# Patient Record
Sex: MALE | Race: BLACK OR AFRICAN AMERICAN
[De-identification: names, ages, dates, MRNs, and addresses within clinical notes are randomized per-mention and may not be internally consistent; named-entity substitution may affect disease eponyms.]

---

## 2018-01-12 ENCOUNTER — HOSPITAL ENCOUNTER (INPATIENT)
Dept: HOSPITAL 74 - YASAS | Age: 53
LOS: 4 days | Discharge: HOME | End: 2018-01-16
Attending: INTERNAL MEDICINE | Admitting: INTERNAL MEDICINE
Payer: COMMERCIAL

## 2018-01-12 VITALS — BODY MASS INDEX: 27 KG/M2

## 2018-01-12 DIAGNOSIS — F17.213: ICD-10-CM

## 2018-01-12 DIAGNOSIS — I10: ICD-10-CM

## 2018-01-12 DIAGNOSIS — Z88.8: ICD-10-CM

## 2018-01-12 DIAGNOSIS — Z91.010: ICD-10-CM

## 2018-01-12 DIAGNOSIS — F12.20: ICD-10-CM
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
## 2018-01-12 DIAGNOSIS — F19.24: ICD-10-CM

## 2018-01-12 DIAGNOSIS — F16.20: ICD-10-CM

## 2018-01-12 DIAGNOSIS — E78.00: ICD-10-CM
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
## 2018-01-12 DIAGNOSIS — G47.00: ICD-10-CM

## 2018-01-12 DIAGNOSIS — F10.230: Primary | ICD-10-CM
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
## 2018-01-12 DIAGNOSIS — H40.2232: ICD-10-CM

## 2018-01-12 DIAGNOSIS — F14.20: ICD-10-CM

## 2018-01-12 PROCEDURE — HZ2ZZZZ DETOXIFICATION SERVICES FOR SUBSTANCE ABUSE TREATMENT: ICD-10-PCS | Performed by: INTERNAL MEDICINE
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
## 2018-01-13 LAB
ALBUMIN SERPL-MCNC: 3.4 G/DL (ref 3.4–5)
ALP SERPL-CCNC: 98 U/L (ref 45–117)
ALT SERPL-CCNC: 37 U/L (ref 12–78)
ANION GAP SERPL CALC-SCNC: 8 MMOL/L (ref 8–16)
APPEARANCE UR: CLEAR
AST SERPL-CCNC: 32 U/L (ref 15–37)
BILIRUB SERPL-MCNC: 1.2 MG/DL (ref 0.2–1)
BILIRUB UR STRIP.AUTO-MCNC: NEGATIVE MG/DL
BUN SERPL-MCNC: 20 MG/DL (ref 7–18)
CALCIUM SERPL-MCNC: 8.8 MG/DL (ref 8.5–10.1)
CHLORIDE SERPL-SCNC: 105 MMOL/L (ref 98–107)
CO2 SERPL-SCNC: 32 MMOL/L (ref 21–32)
COLOR UR: YELLOW
CREAT SERPL-MCNC: 1.3 MG/DL (ref 0.7–1.3)
DEPRECATED RDW RBC AUTO: 16.2 % (ref 11.9–15.9)
GLUCOSE SERPL-MCNC: 83 MG/DL (ref 74–106)
HCT VFR BLD CALC: 40.9 % (ref 35.4–49)
HGB BLD-MCNC: 13.6 GM/DL (ref 11.7–16.9)
KETONES UR QL STRIP: NEGATIVE
LEUKOCYTE ESTERASE UR QL STRIP.AUTO: NEGATIVE
MCH RBC QN AUTO: 35.1 PG (ref 25.7–33.7)
MCHC RBC AUTO-ENTMCNC: 33.2 G/DL (ref 32–35.9)
MCV RBC: 105.5 FL (ref 80–96)
NITRITE UR QL STRIP: NEGATIVE
PH UR: 7 [PH] (ref 5–8)
PLATELET # BLD AUTO: 208 K/MM3 (ref 134–434)
PMV BLD: 8.5 FL (ref 7.5–11.1)
POTASSIUM SERPLBLD-SCNC: 3.5 MMOL/L (ref 3.5–5.1)
PROT SERPL-MCNC: 6.4 G/DL (ref 6.4–8.2)
PROT UR QL STRIP: NEGATIVE
PROT UR QL STRIP: NEGATIVE
RBC # BLD AUTO: 3.88 M/MM3 (ref 4–5.6)
RBC # UR STRIP: NEGATIVE /UL
SODIUM SERPL-SCNC: 145 MMOL/L (ref 136–145)
SP GR UR: 1.02 (ref 1–1.03)
UROBILINOGEN UR STRIP-MCNC: 2 MG/DL (ref 0.2–1)
WBC # BLD AUTO: 6.3 K/MM3 (ref 4–10)

## 2018-01-13 RX ADMIN — Medication SCH MG: at 22:45

## 2018-01-13 RX ADMIN — BACITRACIN ZINC SCH: 500 OINTMENT TOPICAL at 14:39

## 2018-01-13 RX ADMIN — AMLODIPINE BESYLATE SCH MG: 10 TABLET ORAL at 10:53

## 2018-01-13 RX ADMIN — IBUPROFEN PRN MG: 400 TABLET, FILM COATED ORAL at 22:47

## 2018-01-13 RX ADMIN — QUETIAPINE FUMARATE SCH MG: 100 TABLET ORAL at 22:45

## 2018-01-13 RX ADMIN — Medication SCH TAB: at 10:50

## 2018-01-13 RX ADMIN — NICOTINE SCH MG: 14 PATCH, EXTENDED RELEASE TRANSDERMAL at 10:50

## 2018-01-13 RX ADMIN — BACITRACIN ZINC SCH GM: 500 OINTMENT TOPICAL at 22:45

## 2018-01-13 RX ADMIN — LATANOPROST SCH DROP: 50 SOLUTION OPHTHALMIC at 22:46

## 2018-01-13 RX ADMIN — BACITRACIN ZINC SCH APPLIC: 500 OINTMENT TOPICAL at 06:11

## 2018-01-13 RX ADMIN — Medication SCH MG: at 00:01

## 2018-01-13 NOTE — PN
S CIWA





- CIWA Score


Nausea/Vomitin


Muscle Tremors: 2


Anxiety: 2


Agitation: 2


Paroxysmal Sweats: 2


Orientation: 0-Oriented


Tacttile Disturbances: 1-Very Mild Itch/Numbness


Auditory Disturbances: 1-Very Mild


Visual Disturbances: 1-Very Mild Sensitivity


Headache: 2-Mild


CIWA-Ar Total Score: 15





BHS Progress Note (SOAP)


Subjective: 





Shakes, sweats,diarrhea, bone pain


Objective: 





18 12:15


 Vital Signs - 8 hr











  18





  06:33 09:19


 


Temperature 97.6 F 97.2 F L


 


Pulse Rate 76 83


 


Respiratory 18 18





Rate  


 


Blood Pressure 145/101 144/104








 Laboratory Last Values











WBC  6.3 K/mm3 (4.0-10.0)   18  08:00    


 


RBC  3.88 M/mm3 (4.00-5.60)  L  18  08:00    


 


Hgb  13.6 GM/dL (11.7-16.9)   18  08:00    


 


Hct  40.9 % (35.4-49)   18  08:00    


 


MCV  105.5 fl (80-96)  H  18  08:00    


 


MCH  35.1 pg (25.7-33.7)  H  18  08:00    


 


MCHC  33.2 g/dl (32.0-35.9)   18  08:00    


 


RDW  16.2 % (11.9-15.9)  H D 18  08:00    


 


Plt Count  208 K/MM3 (134-434)   18  08:00    


 


MPV  8.5 fl (7.5-11.1)   18  08:00    


 


Sodium  145 mmol/L (136-145)   18  08:00    


 


Potassium  3.5 mmol/L (3.5-5.1)   18  08:00    


 


Chloride  105 mmol/L ()   18  08:00    


 


Carbon Dioxide  32 mmol/L (21-32)   18  08:00    


 


Anion Gap  8  (8-16)   18  08:00    


 


BUN  20 mg/dL (7-18)  H D 18  08:00    


 


Creatinine  1.3 mg/dL (0.7-1.3)   18  08:00    


 


Creat Clearance w eGFR  58.20  (>60)   18  08:00    


 


Random Glucose  83 mg/dL ()  D 18  08:00    


 


Calcium  8.8 mg/dL (8.5-10.1)   18  08:00    


 


Total Bilirubin  1.2 mg/dL (0.2-1.0)  H  18  08:00    


 


AST  32 U/L (15-37)  D 18  08:00    


 


ALT  37 U/L (12-78)   18  08:00    


 


Alkaline Phosphatase  98 U/L ()   18  08:00    


 


Total Protein  6.4 g/dl (6.4-8.2)   18  08:00    


 


Albumin  3.4 g/dl (3.4-5.0)   18  08:00    


 


Urine Color  Yellow   18  09:00    


 


Urine Appearance  Clear   18  09:00    


 


Urine pH  7.0  (5.0-8.0)  D 18  09:00    


 


Ur Specific Gravity  1.017  (1.001-1.035)   18  09:00    


 


Urine Protein  Negative  (NEGATIVE)   18  09:00    


 


Urine Glucose (UA)  Negative  (NEGATIVE)   18  09:00    


 


Urine Ketones  Negative  (NEGATIVE)   18  09:00    


 


Urine Blood  Negative  (NEGATIVE)   18  09:00    


 


Urine Nitrite  Negative  (NEGATIVE)   18  09:00    


 


Urine Bilirubin  Negative  (NEGATIVE)   18  09:00    


 


Urine Urobilinogen  2.0 mg/dL (0.2-1.0)   18  09:00    


 


Ur Leukocyte Esterase  Negative  (NEGATIVE)   18  09:00    


 


HIV 1&2 Antibody Screen  Negative   18  08:00    


 


HIV P24 Antigen  Negative   18  08:00    








Labs noted


Assessment: 





18 12:15


Withdrawal sx


Plan: 





Continue detox

## 2018-01-13 NOTE — EKG
Test Reason : 

Blood Pressure : ***/*** mmHG

Vent. Rate : 079 BPM     Atrial Rate : 079 BPM

   P-R Int : 148 ms          QRS Dur : 094 ms

    QT Int : 406 ms       P-R-T Axes : 066 029 028 degrees

   QTc Int : 465 ms

 

NORMAL SINUS RHYTHM

POSSIBLE LEFT ATRIAL ENLARGEMENT

LEFT VENTRICULAR HYPERTROPHY

ABNORMAL ECG

NO PREVIOUS ECGS AVAILABLE

Confirmed by SHIELA CAI MD ([id number]) on 1/13/2018 4:43:16 PM

 

Referred By:             Confirmed By:SHEILA CAI MD

## 2018-01-13 NOTE — CONSULT
[facility] Psychiatric Consult





- Data


Date of interview: 01/13/18


Admission source: [facility]


Identifying data: Readmission to [facility] for this 53 y/o AA male seeking 

detox treatment on [address] for alcohol,cocaine,heroin,phencyclidine and 

marijuana dependence.Patient is single,a father of seven (ten were claimed at a 

previous session with this writer),homeless,unemployed and supported on SSI 

benefits.


Substance Abuse History: Confirmed by patient in this session.Details in 

current BHS report : Smoking history: Current every day smoker.  Have you 

smoked in the past 12 months: Yes.  Aproximately how many cigarettes per day: 

10.  Hx Chewing Tobacco Use: No.  Initiated information on smoking cessation: 

Yes.  'Breaking Loose' booklet given: 01/12/18.  - Substance & Tx. History.  Hx 

Alcohol Use: Yes.  Hx Substance Use: Yes.  Substance Use Type: Alcohol, Cocaine

, Marijuana, Tranquilizers (PCP).  Hx Substance Use Treatment: Yes ([facility]).  - 

Substances Abused.  ** VODKA.  Route: Oral.  Frequency: Daily.  Amount used: 2 

PINTS.  Age of first use: 20.  Date of Last Use: 01/12/18 (1/2 PINT)


Medical History: Hypertension and glaucoma in both eyes.Noted history of penile 

implant in 2015.


Psychiatric History: No reported history of psychiatric hospitaizations.Patient 

informs that he is currently followed at the [facility] health clinic 

in [facility].Under medication management : seroquel 200 mg/hs.Diagnosed with 

Intermittent Explosive Disorder.Las toook his medication " a couple of days ago 

".Mr Quinn denies history of suicide attempts.


Physical/Sexual Abuse/Trauma History: Patient denies.


Additional Comment: Urine Drug Screen Results: THC-Marijuana, JA-Cocaine, PCP-

Phencyclidine.Noted.





Mental Status Exam





- Mental Status Exam


Alert and Oriented to: Time, Place, Person


Cognitive Function: Good


Patient Appearance: Well Groomed


Mood: Nervous, Withdrawn (dysphoric)


Affect: Mood Congruent


Patient Behavior: Appropriate, Cooperative


Speech Pattern: Clear


Voice Loudness: Normal


Thought Process: Intact, Goal Oriented


Thought Disorder: Not Present


Hallucinations: Denies


Suicidal Ideation: Denies


Homicidal Ideation: Denies


Insight/Judgement: Poor


Sleep: Poorly, Difficulty falling asleep


Appetite: Good


Muscle strength/Tone: Normal


Gait/Station: Normal





Psychiatric Findings





- Problem List (Axis 1, 2,3)


(1) Alcohol dependence with uncomplicated withdrawal


Current Visit: Yes   Status: Acute   





(2) Cocaine dependence


Current Visit: Yes   Status: Acute   


Qualifiers: 


   Substance use status: uncomplicated   Qualified Code(s): F14.20 - Cocaine 

dependence, uncomplicated   





(3) Marijuana dependence


Current Visit: Yes   Status: Chronic   





(4) PCP dependence


Current Visit: Yes   Status: Chronic   





(5) Nicotine dependence


Current Visit: Yes   Status: Chronic   


Qualifiers: 


   Nicotine product type: cigarettes   Substance use status: uncomplicated   

Qualified Code(s): F17.210 - Nicotine dependence, cigarettes, uncomplicated   





(6) Substance induced mood disorder


Current Visit: Yes   Status: Acute   





(7) Insomnia


Current Visit: Yes   Status: Acute   





- Initial Treatment Plan


Initial Treatment Plan: Records revisited.Psychoeducation provided in this 

session.Detoxification in progress.Seroquel 100 mg po hs.Ordered at patient's 

request (dose intentionally reduced  as caution for oversedation).Side effects/

benefits discussed.Patient made aware f potential for oversedation,falls,

metabolic syndrome and cardiovascular adverse events.Consent (verbal) given for 

this plan of care.Observation.[facility] contacted at [phone] (with 

patient's verbal permission)  : no new claims since 2015 and NO script for 

seroquel.

## 2018-01-14 RX ADMIN — LATANOPROST SCH DROP: 50 SOLUTION OPHTHALMIC at 22:37

## 2018-01-14 RX ADMIN — BACITRACIN ZINC SCH GM: 500 OINTMENT TOPICAL at 22:37

## 2018-01-14 RX ADMIN — IBUPROFEN PRN MG: 400 TABLET, FILM COATED ORAL at 22:38

## 2018-01-14 RX ADMIN — NICOTINE SCH MG: 14 PATCH, EXTENDED RELEASE TRANSDERMAL at 10:43

## 2018-01-14 RX ADMIN — QUETIAPINE FUMARATE SCH MG: 100 TABLET ORAL at 22:37

## 2018-01-14 RX ADMIN — Medication SCH TAB: at 10:43

## 2018-01-14 RX ADMIN — BACITRACIN ZINC SCH GM: 500 OINTMENT TOPICAL at 10:43

## 2018-01-14 RX ADMIN — AMLODIPINE BESYLATE SCH MG: 10 TABLET ORAL at 10:43

## 2018-01-14 RX ADMIN — Medication SCH MG: at 22:37

## 2018-01-15 RX ADMIN — NICOTINE SCH MG: 14 PATCH, EXTENDED RELEASE TRANSDERMAL at 10:48

## 2018-01-15 RX ADMIN — BACITRACIN ZINC SCH GM: 500 OINTMENT TOPICAL at 10:47

## 2018-01-15 RX ADMIN — Medication SCH TAB: at 10:47

## 2018-01-15 RX ADMIN — BACITRACIN ZINC SCH GM: 500 OINTMENT TOPICAL at 22:24

## 2018-01-15 RX ADMIN — LATANOPROST SCH DROP: 50 SOLUTION OPHTHALMIC at 22:25

## 2018-01-15 RX ADMIN — Medication SCH MG: at 22:24

## 2018-01-15 RX ADMIN — AMLODIPINE BESYLATE SCH MG: 10 TABLET ORAL at 10:47

## 2018-01-15 RX ADMIN — QUETIAPINE FUMARATE SCH MG: 100 TABLET ORAL at 22:24

## 2018-01-15 NOTE — PN
BHS Progress Note (SOAP)


Subjective: 





CALM ,FATIGUE, OOB WITH STAEDY GAIT..


Objective: 





01/15/18 12:46


 Vital Signs











Temperature  97.5 F L  01/15/18 09:27


 


Pulse Rate  81   01/15/18 09:27


 


Respiratory Rate  18   01/15/18 09:27


 


Blood Pressure  123/89   01/15/18 09:27


 


O2 Sat by Pulse Oximetry (%)      








 


 Laboratory Last Values











WBC  6.3 K/mm3 (4.0-10.0)   01/13/18  08:00    


 


RBC  3.88 M/mm3 (4.00-5.60)  L  01/13/18  08:00    


 


Hgb  13.6 GM/dL (11.7-16.9)   01/13/18  08:00    


 


Hct  40.9 % (35.4-49)   01/13/18  08:00    


 


MCV  105.5 fl (80-96)  H  01/13/18  08:00    


 


MCH  35.1 pg (25.7-33.7)  H  01/13/18  08:00    


 


MCHC  33.2 g/dl (32.0-35.9)   01/13/18  08:00    


 


RDW  16.2 % (11.9-15.9)  H D 01/13/18  08:00    


 


Plt Count  208 K/MM3 (134-434)   01/13/18  08:00    


 


MPV  8.5 fl (7.5-11.1)   01/13/18  08:00    


 


Sodium  145 mmol/L (136-145)   01/13/18  08:00    


 


Potassium  3.5 mmol/L (3.5-5.1)   01/13/18  08:00    


 


Chloride  105 mmol/L ()   01/13/18  08:00    


 


Carbon Dioxide  32 mmol/L (21-32)   01/13/18  08:00    


 


Anion Gap  8  (8-16)   01/13/18  08:00    


 


BUN  20 mg/dL (7-18)  H D 01/13/18  08:00    


 


Creatinine  1.3 mg/dL (0.7-1.3)   01/13/18  08:00    


 


Creat Clearance w eGFR  58.20  (>60)   01/13/18  08:00    


 


Random Glucose  83 mg/dL ()  D 01/13/18  08:00    


 


Calcium  8.8 mg/dL (8.5-10.1)   01/13/18  08:00    


 


Total Bilirubin  1.2 mg/dL (0.2-1.0)  H  01/13/18  08:00    


 


AST  32 U/L (15-37)  D 01/13/18  08:00    


 


ALT  37 U/L (12-78)   01/13/18  08:00    


 


Alkaline Phosphatase  98 U/L ()   01/13/18  08:00    


 


Total Protein  6.4 g/dl (6.4-8.2)   01/13/18  08:00    


 


Albumin  3.4 g/dl (3.4-5.0)   01/13/18  08:00    


 


Urine Color  Yellow   01/13/18  09:00    


 


Urine Appearance  Clear   01/13/18  09:00    


 


Urine pH  7.0  (5.0-8.0)  D 01/13/18  09:00    


 


Ur Specific Gravity  1.017  (1.001-1.035)   01/13/18  09:00    


 


Urine Protein  Negative  (NEGATIVE)   01/13/18  09:00    


 


Urine Glucose (UA)  Negative  (NEGATIVE)   01/13/18  09:00    


 


Urine Ketones  Negative  (NEGATIVE)   01/13/18  09:00    


 


Urine Blood  Negative  (NEGATIVE)   01/13/18  09:00    


 


Urine Nitrite  Negative  (NEGATIVE)   01/13/18  09:00    


 


Urine Bilirubin  Negative  (NEGATIVE)   01/13/18  09:00    


 


Urine Urobilinogen  2.0 mg/dL (0.2-1.0)   01/13/18  09:00    


 


Ur Leukocyte Esterase  Negative  (NEGATIVE)   01/13/18  09:00    


 


RPR Titer  Nonreactive  (NONREACTIVE)   01/13/18  08:00    


 


HIV 1&2 Antibody Screen  Negative   01/13/18  08:00    


 


HIV P24 Antigen  Negative   01/13/18  08:00    




















Assessment: 





01/15/18 12:47


DECREASED WITHDRAWAL SX


Plan: 





CONTINUE DETOX
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
## 2018-01-16 VITALS — TEMPERATURE: 99.4 F | SYSTOLIC BLOOD PRESSURE: 125 MMHG | HEART RATE: 96 BPM | DIASTOLIC BLOOD PRESSURE: 91 MMHG

## 2018-01-16 RX ADMIN — Medication SCH TAB: at 10:45

## 2018-01-16 RX ADMIN — NICOTINE SCH: 14 PATCH, EXTENDED RELEASE TRANSDERMAL at 10:46

## 2018-01-16 RX ADMIN — BACITRACIN ZINC SCH GM: 500 OINTMENT TOPICAL at 10:45

## 2018-01-16 RX ADMIN — AMLODIPINE BESYLATE SCH MG: 10 TABLET ORAL at 10:46

## 2018-01-16 NOTE — DS
BHS Detox Discharge Summary


Admission Date: 


01/12/18





Discharge Date: 01/16/18





- History


Present History: Alcohol Dependence, Cocaine Dependence


Additional Comments: 





DETOX COMPLETED. ALERT O X 3. PT REPORTS HE HAS A PRIMARY PROVIDER, DR ALEX PRESTON AT [id number] [place] [address]. PT HAS BEEN REMINDED TO FOLLOW UP FOR 

HIS MEDICAL MANAGEMENT WITH HIS PMD. THIRTY DAYS ONLY RX FOR EYE DROP AND 

NORVASC TRANSMITTED TO PT'S PHARMACY TODAY.





- Physical Exam Results


Vital Signs: 


 Vital Signs











Temperature  99.5 F   01/16/18 10:15


 


Pulse Rate  92 H  01/16/18 10:15


 


Respiratory Rate  18   01/16/18 10:15


 


Blood Pressure  104/80   01/16/18 10:15


 


O2 Sat by Pulse Oximetry (%)      











Pertinent Admission Physical Exam Findings: 





WITHDRAWAL SX


 Laboratory Last Values











WBC  6.3 K/mm3 (4.0-10.0)   01/13/18  08:00    


 


RBC  3.88 M/mm3 (4.00-5.60)  L  01/13/18  08:00    


 


Hgb  13.6 GM/dL (11.7-16.9)   01/13/18  08:00    


 


Hct  40.9 % (35.4-49)   01/13/18  08:00    


 


MCV  105.5 fl (80-96)  H  01/13/18  08:00    


 


MCH  35.1 pg (25.7-33.7)  H  01/13/18  08:00    


 


MCHC  33.2 g/dl (32.0-35.9)   01/13/18  08:00    


 


RDW  16.2 % (11.9-15.9)  H D 01/13/18  08:00    


 


Plt Count  208 K/MM3 (134-434)   01/13/18  08:00    


 


MPV  8.5 fl (7.5-11.1)   01/13/18  08:00    


 


Sodium  145 mmol/L (136-145)   01/13/18  08:00    


 


Potassium  3.5 mmol/L (3.5-5.1)   01/13/18  08:00    


 


Chloride  105 mmol/L ()   01/13/18  08:00    


 


Carbon Dioxide  32 mmol/L (21-32)   01/13/18  08:00    


 


Anion Gap  8  (8-16)   01/13/18  08:00    


 


BUN  20 mg/dL (7-18)  H D 01/13/18  08:00    


 


Creatinine  1.3 mg/dL (0.7-1.3)   01/13/18  08:00    


 


Creat Clearance w eGFR  58.20  (>60)   01/13/18  08:00    


 


Random Glucose  83 mg/dL ()  D 01/13/18  08:00    


 


Calcium  8.8 mg/dL (8.5-10.1)   01/13/18  08:00    


 


Total Bilirubin  1.2 mg/dL (0.2-1.0)  H  01/13/18  08:00    


 


AST  32 U/L (15-37)  D 01/13/18  08:00    


 


ALT  37 U/L (12-78)   01/13/18  08:00    


 


Alkaline Phosphatase  98 U/L ()   01/13/18  08:00    


 


Total Protein  6.4 g/dl (6.4-8.2)   01/13/18  08:00    


 


Albumin  3.4 g/dl (3.4-5.0)   01/13/18  08:00    


 


Urine Color  Yellow   01/13/18  09:00    


 


Urine Appearance  Clear   01/13/18  09:00    


 


Urine pH  7.0  (5.0-8.0)  D 01/13/18  09:00    


 


Ur Specific Gravity  1.017  (1.001-1.035)   01/13/18  09:00    


 


Urine Protein  Negative  (NEGATIVE)   01/13/18  09:00    


 


Urine Glucose (UA)  Negative  (NEGATIVE)   01/13/18  09:00    


 


Urine Ketones  Negative  (NEGATIVE)   01/13/18  09:00    


 


Urine Blood  Negative  (NEGATIVE)   01/13/18  09:00    


 


Urine Nitrite  Negative  (NEGATIVE)   01/13/18  09:00    


 


Urine Bilirubin  Negative  (NEGATIVE)   01/13/18  09:00    


 


Urine Urobilinogen  2.0 mg/dL (0.2-1.0)   01/13/18  09:00    


 


Ur Leukocyte Esterase  Negative  (NEGATIVE)   01/13/18  09:00    


 


RPR Titer  Nonreactive  (NONREACTIVE)   01/13/18  08:00    


 


HIV 1&2 Antibody Screen  Negative   01/13/18  08:00    


 


HIV P24 Antigen  Negative   01/13/18  08:00    








CXR WNL





- Treatment


Hospital Course: Detox Protocol Followed, Detoxed Safely, Responded well, 

Discharged Condition Good





- Medication


Discharge Medications: 


Ambulatory Orders





Amlodipine Besylate [Norvasc -] 10 mg PO DAILY #30 tablet 01/16/18 


Latanoprost 0.005% Eye Drops [Xalatan 0.005% Eye Drops -] 1 drop OU HS #1 drops 

01/16/18 











- Diagnosis


(1) Alcohol dependence with uncomplicated withdrawal


Current Visit: Yes   Status: Acute   





(2) Cocaine dependence


Current Visit: Yes   Status: Acute   


Qualifiers: 


   Substance use status: uncomplicated   Qualified Code(s): F14.20 - Cocaine 

dependence, uncomplicated   





(3) Nicotine dependence


Current Visit: Yes   Status: Acute   


Qualifiers: 


   Nicotine product type: cigarettes   Substance use status: in withdrawal   

Qualified Code(s): F17.213 - Nicotine dependence, cigarettes, with withdrawal   





(4) Glaucoma


Current Visit: Yes   Status: Chronic   


Qualifiers: 


   Glaucoma type: primary angle-closure   Primary angle closure glaucoma type: 

chronic   Laterality: bilateral   Glaucoma stage: moderate stage   Qualified 

Code(s): H40.2232 - Chronic angle-closure glaucoma, bilateral, moderate stage   





(5) Hypertension


Current Visit: No   Status: Chronic   


Qualifiers: 


   Hypertension type: essential hypertension   Qualified Code(s): I10 - 

Essential (primary) hypertension   





- AMA


Did Patient Leave Against Medical Advice: No